# Patient Record
(demographics unavailable — no encounter records)

---

## 2025-07-16 NOTE — HISTORY OF PRESENT ILLNESS
[de-identified] : Ms. Pastor is a 39F with metastatic colon cancer with involvement in the liver, lung and peritoneum first diagnosed in 2023 after delivery of her baby s/p multiple lines of treatment including a KRAS G12D TCR therapy in December 2024 at [facility] who comes for second opinion. 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 These symptoms have resolved and she has completed her course of antivirals. Today she is interested in discussing the risks/benefits of further treatment with possible cytoreduction and HIPEC.  Symptoms: Neuropathy -> improved with gabapentin, cymbalta, accupuncture Otherwise, no nausea, constipation, diarrhea, no pain, able to do all ADLs  Priorities: Discussing cytoreductive surgery and HIPEC   Pathology Colon: Adenocarcinoma, MMR-proficient Liver biopsy (needle core) (9/18/2023): Moderately differentiated adenocarcinoma with mucinous features, morphologically consistent with colonic origin  Lung, left upper lobe wedge resection (3/14/2025): Metastatic colorectal adenocarcinoma Tumor size: 2.3 x 1.7 x 1.2 cm Visceral pleura invasion Margins negative for carcinoma  Peritoneal biopsy (1/16/2025): Negative for carcinoma  Tumor Sites Primary tumor: Left-sided colon mass (diagnosed post-partum) Liver: Hypodense lesions confirmed as metastatic adenocarcinoma Lung (Left upper lobe): Multiple nodules, all biopsied and consistent with metastatic colorectal adenocarcinoma Peritoneum: Stable implants; biopsy negative  Molecular Profile and Mutations: KRAS G12D ([employer], [facility]) PIK3CA E545K ([facility] xF and xT) APC: p.E918* and p.I7724ci ([facility]) NRAS wildtype NIVIA gene noted ([facility]) MSI: Not detected ([facility] xF and xT); undetermined ([facility]) TMB: Cannot be determined ([employer]) MMR: Proficient (confirmed via colonoscopy and liver biopsy) Her2 immunostain: Negative (score 0)  Treatments, Regimens, and Dates Date(s)	Regimen/Procedure	Notes ~Late 2023-Early 2024	mFOLFIRINOX	Initial therapy under Dr. Curry at [place] Jan 2024	Surgical resection	Complete resection of visible tumor Post-Jan 2024	6 months adjuvant therapy	Continued after surgery; MRD briefly negative then turned positive 10/2-12/11/2023	FOLFIRINOX + bevacizumab	Neoadjuvant setting 1/9/2024	R hepatectomy + L hemicolectomy	Surgical intervention 3/11/2024	FOLFIRI + bevacizumab	Systemic treatment 6/17/2024	Splenectomy	Surgical 7/22/2024	[facility] [place]-9[facility] ([id number])	Phase 1 KRAS inhibitor trial 11/21/2024	FOLFIRI	Post-[id number] progression 12/23/2024	NT-112	KRAS/[id number]-fujccows therapy 4/2025	TCR clinical trial	Good response; post-trial scans showed lung progression Rivaroxaban/Xarelto is used to treat and prevent blood clots.  If you are not able to swallow the tablets whole, you may crush the tablets and mix them with a small amount of applesauce and promptly take within four hours. Eat some food right after you swallow the mixture. Take 2.5mg or 10mg tablets of Rivaroxaban/Xarelto with or without food. Take 15mg or 20mg tablets of Rivaroxaban/Xarelto with food. Never skip a dose of Rivaroxaban/Xarelto. If you take Rivaroxaban/Xarelto once a day and you forget to take your dose, take a dose as soon as you remember on the same day. If you take Rivaroxaban/Xarelto 2.5 mg twice a day and you forget to take your dose, skip the missed dose and take your next dose at your usual time. DO NOT take an extra pill to ‘catch up’. If you take Rivaroxaban/Xarelto 15 mg twice a day and you forget to take your dose, take a dose as soon as you remember on the same day. You may take 2 doses at the same time to make up for missed dose ONLY if you take a total of 30mg per day. Notify your doctor that you missed a dose. Take Rivaroxaban/Xarelto at the same time each morning and evening. Rivaroxaban/Xarelto may be taken with other medication or food.

## 2025-07-16 NOTE — ASSESSMENT
[FreeTextEntry1] : Ms. Pastor is a 39F with metastatic colon cancer with involvement in the liver, lung and peritoneum s/p multiple lines of treatment here for initial consultation at [facility]. Currently, the disease in the liver and lungs have been stable. There has been progression in the peritoneal involvement. Wanting to discuss about role of HiPEC along with cytoreductive   Today we discussed the natural history of her disease, prior lines of treatment, expectations for the future and next steps. We spent considerable time going over her prior history and response to therapy. She has done well following CART therapy but does have progressive disease in the abdomen. We discussed that cytoreductive surgery will likely lead to better disease control, but that HIPEC may not have considerable additional benefit. We discussed recent literature in the context of her CART therapy and I addressed that although much is unknown regarding HIPEC's interaction with the immune system, her recent VZV re-activation and meningitis makes me wary of additional stressors. Given somewhat questionable benefit in this complicated patient with recent viral AE, I would weigh against HIPEC.   We went on to discuss potential systemic therapies. It is not clear that she has progressed on 5-FU based therapy, which remains an option. Additional therapies following disease refractory to 5-FU would include immunotherapy based approaches (as long as liver remains clear of disease). These could be in the form of clinical trials versus compassionate use with agents such as Bot+Bal or RIN.    Jovanny: [phone]   #Metastatic Colorectal Cancer:  - Agree with cytoreduction; would gently suggest against HIPEC - Next line of therapy consider Trial vs FOLFIRI vs Immunotherapy based approach  - Consult with Radha regarding egg harvesting/surrogacy

## 2025-07-16 NOTE — ASSESSMENT
[FreeTextEntry1] : Ms. Pastor is a 39F with metastatic colon cancer with involvement in the liver, lung and peritoneum s/p multiple lines of treatment here for initial consultation at [facility]. Currently, the disease in the liver and lungs have been stable. There has been progression in the peritoneal involvement. Wanting to discuss about role of HiPEC along with cytoreductive   Today we discussed the natural history of her disease, prior lines of treatment, expectations for the future and next steps. We spent considerable time going over her prior history and response to therapy. She has done well following CART therapy but does have progressive disease in the abdomen. We discussed that cytoreductive surgery will likely lead to better disease control, but that HIPEC may not have considerable additional benefit. We discussed recent literature in the context of her CART therapy and I addressed that although much is unknown regarding HIPEC's interaction with the immune system, her recent VZV re-activation and meningitis makes me wary of additional stressors. Given somewhat questionable benefit in this complicated patient with recent viral AE, I would weigh against HIPEC.   We went on to discuss potential systemic therapies. It is not clear that she has progressed on 5-FU based therapy, which remains an option. Additional therapies following disease refractory to 5-FU would include immunotherapy based approaches (as long as liver remains clear of disease). These could be in the form of clinical trials versus compassionate use with agents such as Bot+Bal or RIN.    Jovanny: [phone]   #Metastatic Colorectal Cancer:  - Agree with cytoreduction; would gently suggest against HIPEC - Next line of therapy consider Trial vs FOLFIRI vs Immunotherapy based approach  - Consult with Radha regarding egg harvesting/surrogacy

## 2025-07-16 NOTE — ASSESSMENT
[FreeTextEntry1] : Ms. Pastor is a 39F with metastatic colon cancer with involvement in the liver, lung and peritoneum s/p multiple lines of treatment here for initial consultation at [facility]. Currently, the disease in the liver and lungs have been stable. There has been progression in the peritoneal involvement. Wanting to discuss about role of HiPEC along with cytoreductive   Today we discussed the natural history of her disease, prior lines of treatment, expectations for the future and next steps. We spent considerable time going over her prior history and response to therapy. She has done well following CART therapy but does have progressive disease in the abdomen. We discussed that cytoreductive surgery will likely lead to better disease control, but that HIPEC may not have considerable additional benefit. We discussed recent literature in the context of her CART therapy and I addressed that although much is unknown regarding HIPEC's interaction with the immune system, her recent VZV re-activation and meningitis makes me wary of additional stressors. Given somewhat questionable benefit in this complicated patient with recent viral AE, I would weigh against HIPEC.   We went on to discuss potential systemic therapies. It is not clear that she has progressed on 5-FU based therapy, which remains an option. Additional therapies following disease refractory to 5-FU would include immunotherapy based approaches (as long as liver remains clear of disease). These could be in the form of clinical trials versus compassionate use with agents such as Bot+Bal or RIN.    Jovanny: [phone]   #Metastatic Colorectal Cancer:  - Agree with cytoreduction; would gently suggest against HIPEC - Next line of therapy consider Trial vs FOLFIRI vs Immunotherapy based approach  - Consult with Radha regarding egg harvesting/surrogacy

## 2025-07-16 NOTE — HISTORY OF PRESENT ILLNESS
[de-identified] : Ms. Pastor is a 39F with metastatic colon cancer with involvement in the liver, lung and peritoneum first diagnosed in 2023 after delivery of her baby s/p multiple lines of treatment including a KRAS G12D TCR therapy in December 2024 at [facility] who comes for second opinion. 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 These symptoms have resolved and she has completed her course of antivirals. Today she is interested in discussing the risks/benefits of further treatment with possible cytoreduction and HIPEC.  Symptoms: Neuropathy -> improved with gabapentin, cymbalta, accupuncture Otherwise, no nausea, constipation, diarrhea, no pain, able to do all ADLs  Priorities: Discussing cytoreductive surgery and HIPEC   Pathology Colon: Adenocarcinoma, MMR-proficient Liver biopsy (needle core) (9/18/2023): Moderately differentiated adenocarcinoma with mucinous features, morphologically consistent with colonic origin  Lung, left upper lobe wedge resection (3/14/2025): Metastatic colorectal adenocarcinoma Tumor size: 2.3 x 1.7 x 1.2 cm Visceral pleura invasion Margins negative for carcinoma  Peritoneal biopsy (1/16/2025): Negative for carcinoma  Tumor Sites Primary tumor: Left-sided colon mass (diagnosed post-partum) Liver: Hypodense lesions confirmed as metastatic adenocarcinoma Lung (Left upper lobe): Multiple nodules, all biopsied and consistent with metastatic colorectal adenocarcinoma Peritoneum: Stable implants; biopsy negative  Molecular Profile and Mutations: KRAS G12D ([employer], [facility]) PIK3CA E545K ([employer] xF and xT) APC: p.E918* and p.L6414pk ([facility]) NRAS wildtype NIVIA gene noted ([facility]) MSI: Not detected ([facility] xF and xT); undetermined ([facility]) TMB: Cannot be determined ([facility]) MMR: Proficient (confirmed via colonoscopy and liver biopsy) Her2 immunostain: Negative (score 0)  Treatments, Regimens, and Dates Date(s)	Regimen/Procedure	Notes ~Late 2023-Early 2024	mFOLFIRINOX	Initial therapy under Dr. Curry at [place] Jan 2024	Surgical resection	Complete resection of visible tumor Post-Jan 2024	6 months adjuvant therapy	Continued after surgery; MRD briefly negative then turned positive 10/2-12/11/2023	FOLFIRINOX + bevacizumab	Neoadjuvant setting 1/9/2024	R hepatectomy + L hemicolectomy	Surgical intervention 3/11/2024	FOLFIRI + bevacizumab	Systemic treatment 6/17/2024	Splenectomy	Surgical 7/22/2024	[facility]-[id number] ([id number])	Phase 1 KRAS inhibitor trial 11/21/2024	FOLFIRI	Post-[id number] progression 12/23/2024	NT-112	KRAS/LSL9KS-sexxugor therapy 4/2025	TCR clinical trial	Good response; post-trial scans showed lung progression

## 2025-07-16 NOTE — HISTORY OF PRESENT ILLNESS
[de-identified] : Ms. Pastor is a 39F with metastatic colon cancer with involvement in the liver, lung and peritoneum first diagnosed in 2023 after delivery of her baby s/p multiple lines of treatment including a KRAS G12D TCR therapy in December 2024 at [facility] who comes for second opinion. 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 These symptoms have resolved and she has completed her course of antivirals. Today she is interested in discussing the risks/benefits of further treatment with possible cytoreduction and HIPEC.  Symptoms: Neuropathy -> improved with gabapentin, cymbalta, accupuncture Otherwise, no nausea, constipation, diarrhea, no pain, able to do all ADLs  Priorities: Discussing cytoreductive surgery and HIPEC   Pathology Colon: Adenocarcinoma, MMR-proficient Liver biopsy (needle core) (9/18/2023): Moderately differentiated adenocarcinoma with mucinous features, morphologically consistent with colonic origin  Lung, left upper lobe wedge resection (3/14/2025): Metastatic colorectal adenocarcinoma Tumor size: 2.3 x 1.7 x 1.2 cm Visceral pleura invasion Margins negative for carcinoma  Peritoneal biopsy (1/16/2025): Negative for carcinoma  Tumor Sites Primary tumor: Left-sided colon mass (diagnosed post-partum) Liver: Hypodense lesions confirmed as metastatic adenocarcinoma Lung (Left upper lobe): Multiple nodules, all biopsied and consistent with metastatic colorectal adenocarcinoma Peritoneum: Stable implants; biopsy negative  Molecular Profile and Mutations: KRAS G12D ([employer], [facility]) PIK3CA E545K ([facility] xF and xT) APC: p.E918* and p.M4848jc ([employer]) NRAS wildtype NIVIA gene noted ([facility]) MSI: Not detected ([facility] xF and xT); undetermined ([facility]) TMB: Cannot be determined ([facility]) MMR: Proficient (confirmed via colonoscopy and liver biopsy) Her2 immunostain: Negative (score 0)  Treatments, Regimens, and Dates Date(s)	Regimen/Procedure	Notes ~Late 2023-Early 2024	mFOLFIRINOX	Initial therapy under Dr. Curry at [place] Jan 2024	Surgical resection	Complete resection of visible tumor Post-Jan 2024	6 months adjuvant therapy	Continued after surgery; MRD briefly negative then turned positive 10/2-12/11/2023	FOLFIRINOX + bevacizumab	Neoadjuvant setting 1/9/2024	R hepatectomy + L hemicolectomy	Surgical intervention 3/11/2024	FOLFIRI + bevacizumab	Systemic treatment 6/17/2024	Splenectomy	Surgical 7/22/2024	[facility] ([id number])	Phase 1 KRAS inhibitor trial 11/21/2024	FOLFIRI	Post-[id number] progression 12/23/2024	NT-112	KRAS/[id number] therapy 4/2025	TCR clinical trial	Good response; post-trial scans showed lung progression

## 2025-07-16 NOTE — PHYSICAL EXAM
[Fully active, able to carry on all pre-disease performance without restriction] : Status 0 - Fully active, able to carry on all pre-disease performance without restriction [Normal] : affect appropriate [de-identified] : RRR

## 2025-07-16 NOTE — PHYSICAL EXAM
[Fully active, able to carry on all pre-disease performance without restriction] : Status 0 - Fully active, able to carry on all pre-disease performance without restriction [Normal] : affect appropriate [de-identified] : RRR

## 2025-07-16 NOTE — PHYSICAL EXAM
[Fully active, able to carry on all pre-disease performance without restriction] : Status 0 - Fully active, able to carry on all pre-disease performance without restriction [Normal] : affect appropriate [de-identified] : RRR